# Patient Record
Sex: FEMALE
[De-identification: names, ages, dates, MRNs, and addresses within clinical notes are randomized per-mention and may not be internally consistent; named-entity substitution may affect disease eponyms.]

---

## 2018-03-22 ENCOUNTER — TRANSCRIPTION ENCOUNTER (OUTPATIENT)
Age: 38
End: 2018-03-22

## 2021-02-07 ENCOUNTER — TRANSCRIPTION ENCOUNTER (OUTPATIENT)
Age: 41
End: 2021-02-07

## 2022-06-28 ENCOUNTER — NON-APPOINTMENT (OUTPATIENT)
Age: 42
End: 2022-06-28

## 2023-12-25 ENCOUNTER — NON-APPOINTMENT (OUTPATIENT)
Age: 43
End: 2023-12-25

## 2024-04-04 ENCOUNTER — APPOINTMENT (OUTPATIENT)
Dept: DERMATOLOGY | Facility: CLINIC | Age: 44
End: 2024-04-04
Payer: SELF-PAY

## 2024-04-04 ENCOUNTER — APPOINTMENT (OUTPATIENT)
Dept: DERMATOLOGY | Facility: CLINIC | Age: 44
End: 2024-04-04
Payer: COMMERCIAL

## 2024-04-04 DIAGNOSIS — L91.8 OTHER HYPERTROPHIC DISORDERS OF THE SKIN: ICD-10-CM

## 2024-04-04 DIAGNOSIS — L82.1 OTHER SEBORRHEIC KERATOSIS: ICD-10-CM

## 2024-04-04 DIAGNOSIS — D36.10 BENIGN NEOPLASM OF PERIPHERAL NERVES AND AUTONOMIC NERVOUS SYSTEM, UNSPECIFIED: ICD-10-CM

## 2024-04-04 DIAGNOSIS — D18.01 HEMANGIOMA OF SKIN AND SUBCUTANEOUS TISSUE: ICD-10-CM

## 2024-04-04 DIAGNOSIS — D22.9 MELANOCYTIC NEVI, UNSPECIFIED: ICD-10-CM

## 2024-04-04 DIAGNOSIS — L70.0 ACNE VULGARIS: ICD-10-CM

## 2024-04-04 PROBLEM — Z00.00 ENCOUNTER FOR PREVENTIVE HEALTH EXAMINATION: Status: ACTIVE | Noted: 2024-04-04

## 2024-04-04 PROCEDURE — D0123: CPT

## 2024-04-04 PROCEDURE — 99204 OFFICE O/P NEW MOD 45 MIN: CPT

## 2024-04-04 RX ORDER — TRETINOIN 0.5 MG/G
0.05 CREAM TOPICAL
Qty: 1 | Refills: 3 | Status: ACTIVE | COMMUNITY
Start: 2024-04-04 | End: 1900-01-01

## 2024-04-04 NOTE — HISTORY OF PRESENT ILLNESS
[FreeTextEntry1] : tbse, acne [de-identified] : 42yo F presents for TBSE, acne, skin concerns No personal or family hx of skin cancer has a few skin lesions on the neck, chest, back, arms that she is concerned about wears sunscreen also with acne, no tx SH: lives in the area, has 3 children

## 2024-04-04 NOTE — HISTORY OF PRESENT ILLNESS
[FreeTextEntry1] : skin lesions [de-identified] : 44yo F presents for lesion removal SKs on the R arm, L arm, back, and skin tags on the neck

## 2024-04-04 NOTE — PHYSICAL EXAM
[Alert] : alert [Oriented x 3] : ~L oriented x 3 [Full Body Skin Exam Performed] : performed [FreeTextEntry3] : PE:   General: well-appearing, alert, in no acute distress   Full body skin exam performed examining scalp, head, face, ears, eyes, mouth, neck, chest, back, abdomen, axilla, b/l arms, b/l forearms, b/l hands, b/l fingernails, b/l thighs, b/l legs, b/l feet, b/l toenails Pertinent findings include: -groin/genitalia/buttocks exam deferred -scattered light brown to dark brown colored <6mm papules and macules without any irregular border, color, or asymmetry on the trunk and extremities, consistent with benign nevi. -brown stuck on papules, plaques on the trunk and extremities -red symmetric papules on the trunk, extremities -small skin colored papules on the neck -soft papule on R buttock/upper posterior thigh

## 2024-04-04 NOTE — ASSESSMENT
[FreeTextEntry1] : #Skin tags #Seborrheic keratoses -removal via cryotherapy and snip removal as below. Reviewed risks including scarring, post inflammatory hypo or hypopigmentation, incomplete removal, recurrence, and development of new lesions. The risks/benefits/alternatives of cryo-destruction was explained to the patient which, include but are not limited to redness, swelling, pain, blistering, scar, discoloration of skin, and recurrence. The patient expressed understanding of these risks and agreed to the procedure. 4 lesions treated with 2 cycles of LN2. The procedure was well tolerated, without complication. Wound care was reviewed. Snip Remova Procedure Note Informed verbal consent was obtained. Common risks, benefits, and alternatives were explained. Side effects including but not limited to bleeding, pain, ecchymosis, infection, scarring, nerve damage, and recurrence were discussed. Procedure site was cleaned with alcohol pad and local anesthesia was achieved by using a total of 0.5 cc of 1% lidocaine with epinephrine. Selected lesion(s) on the neck were removed via snip technique using gradle scissors. Hemostasis was achieved with aluminum chloride solution. The patient tolerated the procedure well without complications. Site was dressed with white petrolatum (Vaseline) ointment and Band-Aid. Verbal wound care instructions were provided.    SK < 15; $300 charged today

## 2024-04-04 NOTE — PHYSICAL EXAM
[Alert] : alert [Oriented x 3] : ~L oriented x 3 [Declined] : declined [FreeTextEntry3] : Focused exam: -few stuck on papules and skin colored papules on the neck, R upper arm, L upper arm, L upper back x 2

## 2024-09-30 NOTE — ASSESSMENT
[FreeTextEntry1] : #Multiple benign nevi - chronic, stable - I discussed the chronic nature and course of the condition - Photoprotection discussed, recommend daily broad-spectrum sunscreen, SPF 30 or greater, UPF hat, clothing. - Pt educated on ABCDE of melanoma - Recommend self-skin exam and annual skin exam by MD - Pt instructed to return for new or changing lesions especially if any moles start to change, itch, or bleed   #SK #Cherry angioma chronic, stable -reassurance regarding benign nature -if cherry angioma removal desired can schedule PDL PRN  #Acne vulgaris -chronic, flaring -I have discussed the chronic nature and course of this condition - start tretinoin 0.05% cream at night. Start off 2 nights a week and work up to nightly as tolerated, SED including irritation, cannot be used in pregnancy. Use gentle moisturizer. 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 Can schedule PRN.   RTC 1 year for TBSE    36.8

## 2024-12-06 ENCOUNTER — NON-APPOINTMENT (OUTPATIENT)
Age: 44
End: 2024-12-06

## 2025-06-28 ENCOUNTER — NON-APPOINTMENT (OUTPATIENT)
Age: 45
End: 2025-06-28

## 2025-09-07 ENCOUNTER — NON-APPOINTMENT (OUTPATIENT)
Age: 45
End: 2025-09-07

## 2025-09-10 ENCOUNTER — NON-APPOINTMENT (OUTPATIENT)
Age: 45
End: 2025-09-10